# Patient Record
Sex: MALE | Race: WHITE | NOT HISPANIC OR LATINO | Employment: OTHER | URBAN - METROPOLITAN AREA
[De-identification: names, ages, dates, MRNs, and addresses within clinical notes are randomized per-mention and may not be internally consistent; named-entity substitution may affect disease eponyms.]

---

## 2023-12-19 ENCOUNTER — OFFICE VISIT (OUTPATIENT)
Dept: NEUROSURGERY | Facility: CLINIC | Age: 73
End: 2023-12-19
Payer: MEDICARE

## 2023-12-19 VITALS
SYSTOLIC BLOOD PRESSURE: 118 MMHG | HEART RATE: 81 BPM | HEIGHT: 68 IN | WEIGHT: 230 LBS | TEMPERATURE: 96.8 F | DIASTOLIC BLOOD PRESSURE: 68 MMHG | BODY MASS INDEX: 34.86 KG/M2

## 2023-12-19 DIAGNOSIS — M54.12 CERVICAL RADICULOPATHY: Primary | ICD-10-CM

## 2023-12-19 DIAGNOSIS — Z98.1 STATUS POST CERVICAL SPINAL FUSION: ICD-10-CM

## 2023-12-19 PROCEDURE — 1159F MED LIST DOCD IN RCRD: CPT | Performed by: PHYSICIAN ASSISTANT

## 2023-12-19 PROCEDURE — 1125F AMNT PAIN NOTED PAIN PRSNT: CPT | Performed by: PHYSICIAN ASSISTANT

## 2023-12-19 PROCEDURE — 99204 OFFICE O/P NEW MOD 45 MIN: CPT | Performed by: PHYSICIAN ASSISTANT

## 2023-12-19 RX ORDER — CARBIDOPA AND LEVODOPA 25; 100 MG/1; MG/1
1 TABLET ORAL
COMMUNITY

## 2023-12-19 RX ORDER — CLONAZEPAM 0.5 MG/1
0.5 TABLET ORAL NIGHTLY
COMMUNITY
Start: 2023-11-08 | End: 2024-01-08

## 2023-12-19 RX ORDER — ROSUVASTATIN CALCIUM 40 MG/1
40 TABLET, COATED ORAL DAILY
COMMUNITY

## 2023-12-19 RX ORDER — ASPIRIN 81 MG/1
81 TABLET ORAL DAILY
COMMUNITY

## 2023-12-19 RX ORDER — TIZANIDINE 2 MG/1
2 TABLET ORAL EVERY 8 HOURS PRN
COMMUNITY
Start: 2023-11-30

## 2023-12-19 ASSESSMENT — PATIENT HEALTH QUESTIONNAIRE - PHQ9
2. FEELING DOWN, DEPRESSED OR HOPELESS: NOT AT ALL
7. TROUBLE CONCENTRATING ON THINGS, SUCH AS READING THE NEWSPAPER OR WATCHING TELEVISION: NEARLY EVERY DAY
8. MOVING OR SPEAKING SO SLOWLY THAT OTHER PEOPLE COULD HAVE NOTICED. OR THE OPPOSITE, BEING SO FIGETY OR RESTLESS THAT YOU HAVE BEEN MOVING AROUND A LOT MORE THAN USUAL: NEARLY EVERY DAY
SUM OF ALL RESPONSES TO PHQ QUESTIONS 1-9: 18
10. IF YOU CHECKED OFF ANY PROBLEMS, HOW DIFFICULT HAVE THESE PROBLEMS MADE IT FOR YOU TO DO YOUR WORK, TAKE CARE OF THINGS AT HOME, OR GET ALONG WITH OTHER PEOPLE: VERY DIFFICULT
1. LITTLE INTEREST OR PLEASURE IN DOING THINGS: NEARLY EVERY DAY
6. FEELING BAD ABOUT YOURSELF - OR THAT YOU ARE A FAILURE OR HAVE LET YOURSELF OR YOUR FAMILY DOWN: NOT AT ALL
9. THOUGHTS THAT YOU WOULD BE BETTER OFF DEAD, OR OF HURTING YOURSELF: NOT AT ALL
SUM OF ALL RESPONSES TO PHQ9 QUESTIONS 1 & 2: 3
3. TROUBLE FALLING OR STAYING ASLEEP: NEARLY EVERY DAY
4. FEELING TIRED OR HAVING LITTLE ENERGY: NEARLY EVERY DAY
5. POOR APPETITE OR OVEREATING: NEARLY EVERY DAY

## 2023-12-19 ASSESSMENT — PAIN SCALES - GENERAL: PAINLEVEL: 10-WORST PAIN EVER

## 2023-12-21 NOTE — PROGRESS NOTES
[facility] [place]  Department of Neurological Surgery  New Patient Visit    History of Present Illness:  Shabbir Vega is a 73 y.o. year old male who presents to the spine clinic with neck pain radiating to his shoulder blades and extending into his right arm greater in his left to his ring and pinky finger of his right hand.  He has been intermittent over the past few years and he currently presents with a recent history of stabbing in such radiating pain.  Prednisone significantly improves his symptoms to where he is feeling much better on visit today after dosages begun by his primary care he denies low back and leg symptoms at this time.  Unknown what specific activity leads to flareups of his symptoms.  He is recommended to avoid NSAIDs due to multiple stents.  He denies ambulatory deficit or bowel or bladder concerns.    Prior Spine Surgeries: none    Physical Therapy: no  Diabetic: no   Osteoporosis: no  Patient's BMI is Body mass index is 34.97 kg/m².    14/14 systems reviewed and negative other than what is listed in the history of present illness    There is no problem list on file for this patient.    Past Medical History:   Diagnosis Date    Parkinson's disease     Parkinson disease, symptomatic    Personal history of diseases of the blood and blood-forming organs and certain disorders involving the immune mechanism     History of hemorrhagic diathesis    Personal history of other diseases of the circulatory system     History of cardiac disorder    Personal history of other diseases of the musculoskeletal system and connective tissue     History of arthritis    Personal history of other diseases of the nervous system and sense organs     History of cataract    Personal history of other endocrine, nutritional and metabolic disease     History of high cholesterol    Personal history of other specified conditions     History of chest pain     Past Surgical History:   Procedure Laterality  Date    HIP SURGERY  04/04/2018    Hip Surgery    NECK SURGERY  04/04/2018    Neck Surgery    OTHER SURGICAL HISTORY  04/04/2018    Brain Surgery    OTHER SURGICAL HISTORY  10/10/2022    Heart surgery     Social History     Tobacco Use    Smoking status: Never    Smokeless tobacco: Not on file   Substance Use Topics    Alcohol use: Not Currently     family history is not on file.    Current Outpatient Medications:     aspirin 81 mg EC tablet, Take 1 tablet (81 mg) by mouth once daily., Disp: , Rfl:     carbidopa-levodopa (Sinemet)  mg tablet, Take 1 tablet by mouth 5 times a day., Disp: , Rfl:     clonazePAM (KlonoPIN) 0.5 mg tablet, Take 1 tablet (0.5 mg) by mouth once daily at bedtime., Disp: , Rfl:     rosuvastatin (Crestor) 40 mg tablet, Take 1 tablet (40 mg) by mouth once daily., Disp: , Rfl:     tiZANidine (Zanaflex) 2 mg tablet, Take 1 tablet (2 mg) by mouth every 8 hours if needed for muscle spasms., Disp: , Rfl:   Allergies   Allergen Reactions    Penicillins Hives and Rash     From 10yrs ago, patient thinks it was from detergent on sheets    Unknown reaction . Pt told CNP that he was not sure he was really allergic, had rash in past but not sure from PCN.       Physical Examination    General: Well developed, awake/alert/oriented x3, no distress, alert and cooperative  Skin: Warm and dry, no lesions, no rashes  ENMT: Mucous membranes moist, no apparent injury, no lesions seen  Head/Neck: Neck Supple, no apparent injury  Respiratory/Thorax: Normal breath sounds with good chest expansion, thorax symmetric  Cardiovascular: No pitting edema, no JVD    Motor Strength: 5/5 Throughout all extremities    Muscle Bulk: Normal and symmetric in all extremities    Posture:   -- Cervical: Normal  -- Thoracic: Normal  -- Lumbar : Normal  Paraspinal muscle spasm/tenderness present right cervical  Midline tenderness present    Sensation: Mild deficit to light touch posteriorly through right arm over triceps and  forearm and last fingers          Assessment and Plan:    Shabbir Vega is a 73 y.o. year old male who presents to the spine clinic with neck pain radiating to his shoulder blades and extending into his right arm greater in his left to his ring and pinky finger of his right hand.  He has been intermittent over the past few years and he currently presents with a recent history of stabbing in such radiating pain.  Prednisone significantly improves his symptoms to where he is feeling much better on visit today after dosages begun by his primary care he denies low back and leg symptoms at this time.  Unknown what specific activity leads to flareups of his symptoms.  He is recommended to avoid NSAIDs due to multiple stents.  He denies ambulatory deficit or bowel or bladder concerns.  With pain symptoms currently improved significantly by prednisone likely confirmed inflammatory component of his pain generator.  Will begin with referral to physical therapy for core strengthening exercises as well as to pain management for discussion of nonsurgical treatment options.  Also obtain x-rays with flexion-extension views to evaluate for overall alignment and dynamic stability.  If his symptoms continue updated MRI will be ordered.    The above clinical summary has been dictated with voice recognition software. It has not been proofread for grammatical errors, typographical mistakes, or other semantic inconsistencies.    Thank you for visiting our office today. It was our pleasure to take part in your healthcare.     Do not hesitate to call with any questions regarding your plan of care after leaving at [phone] M-F 8am-4pm.     To clinicians, thank you very much for this kind referral. It is a privilege to partner with you in the care of your patients. My office would be delighted to assist you with any further consultations or with questions regarding the plan of care outlined. Do not hesitate to call the office or contact  me directly.       Sincerely,      LINO Ugalde, PA-C  Associate Physician Assistant, Neurosurgery  Clinical   [facility] [place] [address]    Phone: [phone]  Fax: [phone]

## 2023-12-22 ENCOUNTER — TELEPHONE (OUTPATIENT)
Dept: PAIN MEDICINE | Facility: CLINIC | Age: 73
End: 2023-12-22
Payer: MEDICARE

## 2023-12-22 NOTE — TELEPHONE ENCOUNTER
Called patient to schedule initial consult. Left voicemail for patient to call us back to schedule.

## 2023-12-29 ENCOUNTER — TELEPHONE (OUTPATIENT)
Dept: PAIN MEDICINE | Facility: CLINIC | Age: 73
End: 2023-12-29
Payer: MEDICARE

## 2024-01-03 ENCOUNTER — TELEPHONE (OUTPATIENT)
Dept: PAIN MEDICINE | Facility: CLINIC | Age: 74
End: 2024-01-03
Payer: MEDICARE

## 2024-01-03 NOTE — TELEPHONE ENCOUNTER
Called patient to schedule initial follow up visit. Left vm for patient to call us back to schedule.

## 2024-01-10 ENCOUNTER — TELEPHONE (OUTPATIENT)
Dept: PAIN MEDICINE | Facility: CLINIC | Age: 74
End: 2024-01-10
Payer: MEDICARE

## 2024-01-10 NOTE — TELEPHONE ENCOUNTER
Called patient to schedule initial consult. Wife states the patients pain has resolved and he does not need pain management at this time